# Patient Record
Sex: FEMALE | Race: AMERICAN INDIAN OR ALASKA NATIVE
[De-identification: names, ages, dates, MRNs, and addresses within clinical notes are randomized per-mention and may not be internally consistent; named-entity substitution may affect disease eponyms.]

---

## 2018-01-03 ENCOUNTER — HOSPITAL ENCOUNTER (EMERGENCY)
Dept: HOSPITAL 5 - ED | Age: 33
LOS: 1 days | Discharge: LEFT BEFORE DISCHARGE | End: 2018-01-04
Payer: COMMERCIAL

## 2018-01-03 DIAGNOSIS — Z53.21: Primary | ICD-10-CM

## 2018-01-03 PROCEDURE — 36415 COLL VENOUS BLD VENIPUNCTURE: CPT

## 2018-01-03 PROCEDURE — 85025 COMPLETE CBC W/AUTO DIFF WBC: CPT

## 2018-01-03 PROCEDURE — 80053 COMPREHEN METABOLIC PANEL: CPT

## 2018-01-03 PROCEDURE — 81025 URINE PREGNANCY TEST: CPT

## 2018-01-03 PROCEDURE — 81001 URINALYSIS AUTO W/SCOPE: CPT

## 2018-01-04 VITALS — SYSTOLIC BLOOD PRESSURE: 116 MMHG | DIASTOLIC BLOOD PRESSURE: 68 MMHG

## 2018-01-04 LAB
ALBUMIN SERPL-MCNC: 4.1 G/DL (ref 3.9–5)
ALT SERPL-CCNC: 9 UNITS/L (ref 7–56)
BASOPHILS # (AUTO): 0 K/MM3 (ref 0–0.1)
BASOPHILS NFR BLD AUTO: 0.8 % (ref 0–1.8)
BILIRUB UR QL STRIP: (no result)
BLOOD UR QL VISUAL: (no result)
BUN SERPL-MCNC: 10 MG/DL (ref 7–17)
BUN/CREAT SERPL: 14 %
CALCIUM SERPL-MCNC: 9.2 MG/DL (ref 8.4–10.2)
EOSINOPHIL # BLD AUTO: 0.2 K/MM3 (ref 0–0.4)
EOSINOPHIL NFR BLD AUTO: 3.8 % (ref 0–4.3)
HCT VFR BLD CALC: 39 % (ref 30.3–42.9)
HEMOLYSIS INDEX: 14
HGB BLD-MCNC: 12.5 GM/DL (ref 10.1–14.3)
HYALINE CASTS #/AREA URNS LPF: 1 /LPF
LYMPHOCYTES # BLD AUTO: 2.9 K/MM3 (ref 1.2–5.4)
LYMPHOCYTES NFR BLD AUTO: 49 % (ref 13.4–35)
MCH RBC QN AUTO: 29 PG (ref 28–32)
MCHC RBC AUTO-ENTMCNC: 32 % (ref 30–34)
MCV RBC AUTO: 89 FL (ref 79–97)
MONOCYTES # (AUTO): 0.3 K/MM3 (ref 0–0.8)
MONOCYTES % (AUTO): 5.7 % (ref 0–7.3)
MUCOUS THREADS #/AREA URNS HPF: (no result) /HPF
NITRITE UR QL STRIP: (no result)
PH UR STRIP: 5 [PH] (ref 5–7)
PLATELET # BLD: 208 K/MM3 (ref 140–440)
PROT UR STRIP-MCNC: (no result) MG/DL
RBC # BLD AUTO: 4.39 M/MM3 (ref 3.65–5.03)
RBC #/AREA URNS HPF: 4 /HPF (ref 0–6)
UROBILINOGEN UR-MCNC: 2 MG/DL (ref ?–2)
WBC #/AREA URNS HPF: 1 /HPF (ref 0–6)

## 2019-02-24 ENCOUNTER — HOSPITAL ENCOUNTER (EMERGENCY)
Dept: HOSPITAL 5 - ED | Age: 34
Discharge: HOME | End: 2019-02-24
Payer: MEDICAID

## 2019-02-24 VITALS — DIASTOLIC BLOOD PRESSURE: 57 MMHG | SYSTOLIC BLOOD PRESSURE: 101 MMHG
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
## 2019-02-24 DIAGNOSIS — S46.911A: Primary | ICD-10-CM
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
## 2019-02-24 DIAGNOSIS — M79.10: ICD-10-CM

## 2019-02-24 DIAGNOSIS — Z86.2: ICD-10-CM
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
## 2019-02-24 PROCEDURE — 99283 EMERGENCY DEPT VISIT LOW MDM: CPT

## 2019-02-24 NOTE — EMERGENCY DEPARTMENT REPORT
Upper Extremity





- HPI


Chief Complaint: Extremity Injury, Upper


Stated Complaint: RIGHT ARM INJURY


Time Seen by Provider: 02/24/19 08:17


Upper Extremity: Right Arm, Right Forearm


Occurred When: 1 Day


Mechanism: Hit with Object


Severity: moderate


Symptoms: Yes Pain with Movement, Yes Limited Range of Movement, No Deformity, 

No Numbness, No Weakness, No Swelling, No Bruising/Ecchymosis, No Laceration or 

Abrasion


Other History: stuck arm in dryer yesterday causing it to twist.  now with pain 

from shoulder to wrist.  no numbness/weakness





ED Review of Systems


ROS: 


Stated complaint: RIGHT ARM INJURY


Other details as noted in HPI





Comment: All other systems reviewed and negative


Musculoskeletal: as per HPI, myalgia





ED Past Medical Hx





- Past Medical History


Previous Medical History?: Yes


Additional medical history: anemia





- Surgical History


Past Surgical History?: No





- Social History


Smoking Status: Never Smoker


Substance Use Type: None





- Medications


Home Medications: 


                                Home Medications











 Medication  Instructions  Recorded  Confirmed  Last Taken  Type


 


Cyclobenzaprine [Flexeril 10 MG 10 mg PO TID PRN #15 tablet 02/24/19  Unknown Rx





TAB]     


 


Naproxen [Naprosyn] 500 mg PO BID #20 tablet 02/24/19  Unknown Rx














Upper Extremity Exam





- Exam


General: 


Vital signs noted. No distress. Alert and acting appropriately.





Head and Torso: No HEENT Abnormality, No Neck Tenderness, No Chest/Lungs 

Abnormality, No Abdominal Tenderness, No Back Tenderness


Shoulder Exam: Yes Normal Range of Motion in Shoulder, No Clavicle Tenderness, 

No Shoulder Deformity, No AC Joint Tenderness


Arm Exam: Yes Arm/Humerus Tenderness, No Arm Deformity


Elbow: Yes Elbow Tenderness, Yes Normal Range of Motion in Elbow, No Elbow 

Deformity


Forearm: Yes Forearm Tenderness, Yes Pain with Pronation, Yes Pain with 

Supination, No Forearm Deformity


Wrist: Yes Normal ROM in Wrist, No Wrist Tenderness, No Wrist Deformity, No 

Snuffbox Tenderness


Hand: No Hand Tenderness, No Hand Deformity, No Tendon Dysfunction


CMS Exam: Yes Normal Distal Pulses, Yes Normal Capillary Refill, Yes Normal 

Distal Sensation, No Broken Skin





ED Course


                                   Vital Signs











  02/24/19





  07:24


 


Temperature 97.8 F


 


Pulse Rate 66


 


Respiratory 18





Rate 


 


Blood Pressure 101/57





[Left] 


 


O2 Sat by Pulse 100





Oximetry 














ED Medical Decision Making





- Radiology Data


Radiology results: image reviewed


interpreted by me: 





normal humerus and forearm series








- Medical Decision Making





arm pain after injury, NV intact


ddx muscular vs fx


will get plain film imaging





imaging neg


fu pcp/ortho


given sling





- Differential Diagnosis


strain, sprain, fx less likely 


Critical care attestation.: 


If time is entered above; I have spent that time in minutes in the direct care 

of this critically ill patient, excluding procedure time.








ED Disposition


Clinical Impression: 


Strain of upper arm, right


Qualifiers:


 Encounter type: initial encounter Qualified Code(s): S46.911A - Strain of 

unspecified muscle, fascia and tendon at shoulder and upper arm level, right 

arm, initial encounter





Disposition: DC-01 TO HOME OR SELFCARE


Is pt being admited?: No


Condition: Good


Instructions:  Muscle Strain (ED)


Prescriptions: 


Cyclobenzaprine [Flexeril 10 MG TAB] 10 mg PO TID PRN #15 tablet


 PRN Reason: Muscle Spasm


Naproxen [Naprosyn] 500 mg PO BID #20 tablet


Referrals: 


NILESH FOREMAN MD [Primary Care Provider] - 3-5 Days


ESTEFANIA DINH MD [Staff Physician] - 3-5 Days


Time of Disposition: 09:26

## 2019-02-24 NOTE — XRAY REPORT
FINAL REPORT



PROCEDURE:  XR HUMERUS 2+V RT



TECHNIQUE:  RIGHT humerus radiographs, AP and lateral views.



HISTORY:  arm pain, injury 



COMPARISON:  No prior studies are available for comparison.



FINDINGS:  

Fracture (s) and/or Dislocation(s): None . 



Joint space(s): Normal. 



Soft tissues: Normal. 



Bone mineralization: Normal. 



Foreign bodies: None. 



IMPRESSION:  

Normal Examination.

## 2019-08-09 ENCOUNTER — HOSPITAL ENCOUNTER (EMERGENCY)
Dept: HOSPITAL 5 - ED | Age: 34
Discharge: HOME | End: 2019-08-09
Payer: MEDICAID

## 2019-08-09 VITALS — DIASTOLIC BLOOD PRESSURE: 74 MMHG | SYSTOLIC BLOOD PRESSURE: 115 MMHG

## 2019-08-09 DIAGNOSIS — Z79.899: ICD-10-CM

## 2019-08-09 DIAGNOSIS — J06.9: Primary | ICD-10-CM

## 2019-08-09 DIAGNOSIS — J02.9: ICD-10-CM

## 2019-08-09 PROCEDURE — 99283 EMERGENCY DEPT VISIT LOW MDM: CPT

## 2019-08-09 PROCEDURE — 87430 STREP A AG IA: CPT

## 2019-08-09 PROCEDURE — 87116 MYCOBACTERIA CULTURE: CPT

## 2019-08-09 PROCEDURE — 96372 THER/PROPH/DIAG INJ SC/IM: CPT

## 2019-08-09 PROCEDURE — 71046 X-RAY EXAM CHEST 2 VIEWS: CPT

## 2019-08-09 NOTE — EMERGENCY DEPARTMENT REPORT
HPI





- General


Chief Complaint: Upper Respiratory Infection


Time Seen by Provider: 08/09/19 08:20





- HPI


HPI: 





34 yo AA F presents to the ED with the complaint of a 3-4 day history of a sore 

throat, body aches, cough, N/V. She saw her PCP 2 days ago, Tuesday, and was 

told she had flu-like symptoms and possibly strep throat and she was placed on 

Augmentin. She has taken the abx compliantly without any relief. No pmhx. The 

patient says she is not pregnant as "I'm a lesbian" and she did not want 

pregnancy checked on her. No recent travel or sick contacts at home. 





ED Past Medical Hx





- Past Medical History


Additional medical history: anemia





- Social History


Smoking Status: Never Smoker


Substance Use Type: None





- Medications


Home Medications: 


                                Home Medications











 Medication  Instructions  Recorded  Confirmed  Last Taken  Type


 


Cyclobenzaprine [Flexeril 10 MG 10 mg PO TID PRN #15 tablet 02/24/19  Unknown Rx





TAB]     


 


Naproxen [Naprosyn] 500 mg PO BID #20 tablet 02/24/19  Unknown Rx














ED Review of Systems


ROS: 


Stated complaint: CHEST/STOMACH PAIN/WEAKNESS/VOMITING


Other details as noted in HPI





Comment: All other systems reviewed and negative


Constitutional: denies: fever, malaise


Eyes: denies: eye pain, vision change


ENT: throat pain.  denies: ear pain


Respiratory: cough.  denies: shortness of breath


Cardiovascular: denies: palpitations, edema


Gastrointestinal: nausea, vomiting.  denies: abdominal pain


Genitourinary: denies: dysuria, discharge


Musculoskeletal: myalgia.  denies: joint swelling


Skin: denies: rash, lesions


Neurological: denies: weakness, numbness





Physical Exam





- Physical Exam


Vital Signs: 


                                   Vital Signs











  08/09/19





  08:11


 


Temperature 98.5 F


 


Pulse Rate 92 H


 


Respiratory 16





Rate 


 


Blood Pressure 115/74


 


O2 Sat by Pulse 100





Oximetry 











Physical Exam: 





GENERAL: The patient is well-developed well-nourished.


HENT: Normocephalic.  Atraumatic.    Patient has moist mucous membranes.  

Oropharynx is clear without tonsillar hypertrophy, erythema or exudates.  No 

drooling or trismus.


EYES: Extraocular motions are intact.  Pupils equal reactive to light 

bilaterally.


NECK: Supple.  Trachea is midline. 


CHEST/LUNGS: Clear to auscultation.  There is no respiratory distress noted.


HEART/CARDIOVASCULAR: Regular.  There is no tachycardia.  There is no murmur.


ABDOMEN:  There is no abdominal distention.


SKIN: Skin is warm and dry.


NEURO: The patient is awake, alert, and oriented.  The patient is cooperative.  

The patient has no focal neurologic deficits.  The patient has normal speech.


MUSCULOSKELETAL: There is no tenderness or deformity.  There is no evidence of 

acute injury.





ED Course


                                   Vital Signs











  08/09/19





  08:11


 


Temperature 98.5 F


 


Pulse Rate 92 H


 


Respiratory 16





Rate 


 


Blood Pressure 115/74


 


O2 Sat by Pulse 100





Oximetry 














ED Medical Decision Making





- Medical Decision Making





This patient presents to the emergency department with a complaint of a sore 

throat and body aches.  On examination the throat does not show any tonsillar 

hypertrophy, erythema or exudates.  No drooling or trismus.  Vital signs are 

stable including being afebrile.  Negative on rapid strep test.  Patient most 

likely has a viral URI or viral pharyngitis.  She's been instructed to follow up

 with primary care and return to the ER with any worsening of her symptoms or 

any acute distress.





- Differential Diagnosis


strep pharyngitis, viral pharyngitis, mononucleosis


Critical Care Time: No


Critical care attestation.: 


If time is entered above; I have spent that time in minutes in the direct care 

of this critically ill patient, excluding procedure time.








ED Disposition


Clinical Impression: 


Upper respiratory infection


Qualifiers:


 URI type: unspecified URI Qualified Code(s): J06.9 - Acute upper respiratory 

infection, unspecified





Pharyngitis


Qualifiers:


 Pharyngitis/tonsillitis etiology: unspecified etiology Qualified Code(s): J02.9

 - Acute pharyngitis, unspecified





Disposition: DC-01 TO HOME OR SELFCARE


Is pt being admited?: No


Condition: Stable


Instructions:  Upper Respiratory Infection (ED), Viral Syndrome (ED)


Additional Instructions: 


Please follow up with your PCP in the next few days. Return to the emergency 

department with any worsening of your symptoms or any acute distress. 


Referrals: 


PCP, Your [Other] - 3-5 Days


Forms:  Work/School Release Form(ED)


Time of Disposition: 13:27

## 2019-08-09 NOTE — XRAY REPORT
CHEST 2 VIEWS 



INDICATION:  cough.



COMPARISON:  None



FINDINGS:

Support devices: None.



Heart: Within normal limits. 

Lungs/pleura: No acute air space or interstitial disease.  No pneumothorax.



Additional findings: None.



IMPRESSION:

Normal chest x-ray.



Signer Name: Jorge Luis Leon Jr, MD 

Signed: 8/9/2019 8:48 AM

 Workstation Name: [id number]

## 2019-10-27 ENCOUNTER — HOSPITAL ENCOUNTER (EMERGENCY)
Dept: HOSPITAL 5 - ED | Age: 34
Discharge: HOME | End: 2019-10-27
Payer: COMMERCIAL

## 2019-10-27 VITALS — SYSTOLIC BLOOD PRESSURE: 125 MMHG | DIASTOLIC BLOOD PRESSURE: 67 MMHG

## 2019-10-27 DIAGNOSIS — M25.512: ICD-10-CM

## 2019-10-27 DIAGNOSIS — Z79.899: ICD-10-CM

## 2019-10-27 DIAGNOSIS — L03.313: Primary | ICD-10-CM

## 2019-10-27 PROCEDURE — 99282 EMERGENCY DEPT VISIT SF MDM: CPT

## 2019-10-27 NOTE — EVENT NOTE
ED Screening Note


ED Screening Note: 








lump to the left shoulder that began 4 days ago


+pus drainage 


no fever 





no PMHx 


never had before


no allergies to meds

## 2019-10-27 NOTE — EMERGENCY DEPARTMENT REPORT
- General


Chief complaint: Skin/Abscess/Foreign Body


Stated complaint: INJURY TO L SHOULDER


Time Seen by Provider: 10/27/19 16:14


Source: patient


Mode of arrival: Ambulatory


Limitations: No Limitations





- History of Present Illness


Initial comments: 





pt is a 33 yo female who presents to the ED with c/o a lump to the left shoulder

that began 4 days ago. she states that there has been a small amount of pus 

drainage. she denies any abrasions or insect bites prior to the lump appearing. 

she denies any fever, chills, vomiting. she denies ever having this before. she 

denies any PMHx. she denies any allergies to meds. 








- Related Data


                                  Previous Rx's











 Medication  Instructions  Recorded  Last Taken  Type


 


Cyclobenzaprine [Flexeril 10 MG 10 mg PO TID PRN #15 tablet 02/24/19 Unknown Rx





TAB]    


 


Naproxen [Naprosyn] 500 mg PO BID #20 tablet 02/24/19 Unknown Rx


 


Doxycycline Hyclate [Doxycycline 100 mg PO BID 7 Days #14 tablet 10/27/19 Un

known Rx





Hyclate TAB]    


 


Ibuprofen [Motrin 600 MG tab] 600 mg PO Q8H PRN #14 tablet 10/27/19 Unknown Rx











                                    Allergies











Allergy/AdvReac Type Severity Reaction Status Date / Time


 


No Known Allergies Allergy   Verified 08/09/19 08:01














Abscess Boil HPI





- HPI


Chief Complaint: Skin/Abscess/Foreign Body


Stated Complaint: INJURY TO L SHOULDER


Time Seen by Provider: 10/27/19 16:14


Home Medications: 


                                  Previous Rx's











 Medication  Instructions  Recorded  Last Taken  Type


 


Cyclobenzaprine [Flexeril 10 MG 10 mg PO TID PRN #15 tablet 02/24/19 Unknown Rx





TAB]    


 


Naproxen [Naprosyn] 500 mg PO BID #20 tablet 02/24/19 Unknown Rx


 


Doxycycline Hyclate [Doxycycline 100 mg PO BID 7 Days #14 tablet 10/27/19 

Unknown Rx





Hyclate TAB]    


 


Ibuprofen [Motrin 600 MG tab] 600 mg PO Q8H PRN #14 tablet 10/27/19 Unknown Rx











Allergies/Adverse Reactions: 


                                    Allergies











Allergy/AdvReac Type Severity Reaction Status Date / Time


 


No Known Allergies Allergy   Verified 08/09/19 08:01














ED Review of Systems


ROS: 


Stated complaint: INJURY TO L SHOULDER


Other details as noted in HPI





Comment: All other systems reviewed and negative





ED Past Medical Hx





- Past Medical History


Previous Medical History?: Yes


Additional medical history: anemia





- Surgical History


Past Surgical History?: No





- Social History


Smoking Status: Never Smoker


Substance Use Type: Alcohol





- Medications


Home Medications: 


                                Home Medications











 Medication  Instructions  Recorded  Confirmed  Last Taken  Type


 


Cyclobenzaprine [Flexeril 10 MG 10 mg PO TID PRN #15 tablet 02/24/19  Unknown Rx





TAB]     


 


Naproxen [Naprosyn] 500 mg PO BID #20 tablet 02/24/19  Unknown Rx


 


Doxycycline Hyclate [Doxycycline 100 mg PO BID 7 Days #14 tablet 10/27/19  

Unknown Rx





Hyclate TAB]     


 


Ibuprofen [Motrin 600 MG tab] 600 mg PO Q8H PRN #14 tablet 10/27/19  Unknown Rx














ED Physical Exam





- General


Limitations: No Limitations


General appearance: alert, in no apparent distress





- Head


Head exam: Present: atraumatic, normocephalic





- Eye


Eye exam: Present: normal appearance





- ENT


ENT exam: Present: mucous membranes moist





- Neurological Exam


Neurological exam: Present: alert, oriented X3





- Psychiatric


Psychiatric exam: Present: normal affect, normal mood





- Skin


Skin exam: Present: warm, dry, other (2 cm area of induration to the left 

clavicle, no obvious fluctuance, no surrounding erythema, no necrosis)





ED Course


                                   Vital Signs











  10/27/19 10/27/19 10/27/19





  16:10 16:11 16:27


 


Temperature 99 F 99.0 F 


 


Pulse Rate 103 H 107 H 


 


Respiratory 18 18 





Rate   


 


Blood Pressure 125/67 125/67 


 


O2 Sat by Pulse 100 99 100





Oximetry   














ED Medical Decision Making





- Medical Decision Making





pt is a 33 yo female who presents to the ED with c/o a lump to the left shoulder

that began 4 days ago. she states that there has been a small amount of pus 

drainage. she denies any abrasions or insect bites prior to the lump appearing. 

she denies any fever, chills, vomiting. she denies ever having this before. she 

denies any PMHx. she denies any allergies to meds. VSS. on exam: 2 cm area of 

induration to the left clavicle, no obvious fluctuance, no surrounding erythema,

no necrosis. appears to be cellulitis, indurated skin, no drainable abscess at 

this time. pt given prescription for doxycycline and ibuprofen. advised pt 

please take medication as prescribed. please follow up with a primary care 

doctor in the next 3 days for reevaluation. use warm compresses three times a 

day. return to the emergency room for any new or worsening symptoms or any 

worsening signs of infection including but not limited to increasing swelling, 

increasing redness, increasing drainage, fever, vomiting. will need to return to

the emergency room immediately if any worsening despite antibiotics. discussed 

with pt that if symptoms worsen despite abx she will need to return to the ED 

for a possible I&D. 


Critical care attestation.: 


If time is entered above; I have spent that time in minutes in the direct care 

of this critically ill patient, excluding procedure time.








ED Disposition


Clinical Impression: 


Cellulitis


Qualifiers:


 Site of cellulitis: trunk Site of cellulitis of trunk: chest wall Qualified 

Code(s): L03.313 - Cellulitis of chest wall





Disposition: DC-01 TO HOME OR SELFCARE


Is pt being admited?: No


Does the pt Need Aspirin: No


Condition: Stable


Instructions:  Cellulitis (ED)


Additional Instructions: 


please take medication as prescribed. please follow up with a primary care 

doctor in the next 3 days for reevaluation. use warm compresses three times a 

day. return to the emergency room for any new or worsening symptoms or any 

worsening signs of infection including but not limited to increasing swelling, 

increasing redness, increasing drainage, fever, vomiting. will need to return to

the emergency room immediately if any worsening despite antibiotics.


Prescriptions: 


Doxycycline Hyclate [Doxycycline Hyclate TAB] 100 mg PO BID 7 Days #14 tablet


Ibuprofen [Motrin 600 MG tab] 600 mg PO Q8H PRN #14 tablet


 PRN Reason: Pain


Referrals: 


[facility] [Provider Group] - 2-3 Days


Forms:  Work/School Release Form(ED)


Time of Disposition: 16:22


Print Language: ENGLISH

## 2022-07-21 ENCOUNTER — HOSPITAL ENCOUNTER (EMERGENCY)
Dept: HOSPITAL 5 - ED | Age: 37
Discharge: LEFT BEFORE DISCHARGE | End: 2022-07-21
Payer: COMMERCIAL

## 2022-07-21 VITALS — DIASTOLIC BLOOD PRESSURE: 68 MMHG | SYSTOLIC BLOOD PRESSURE: 114 MMHG

## 2022-07-21 DIAGNOSIS — R10.9: Primary | ICD-10-CM

## 2022-07-21 DIAGNOSIS — Z53.21: ICD-10-CM
